# Patient Record
(demographics unavailable — no encounter records)

---

## 2025-03-14 NOTE — ASSESSMENT
[FreeTextEntry1] : 59 YOM with FH of CAD, PAD. JERNIGAN. HLD.  Plan: Low-fat diet discussed. Fasting blood work ordered. Exercise stress ECHO. CT coronary calcium score. F/u after the tests.  Rafa Reyes MD

## 2025-03-14 NOTE — REVIEW OF SYSTEMS
[Dyspnea on exertion] : dyspnea during exertion [Negative] : Neurological [Chest Discomfort] : no chest discomfort [Lower Ext Edema] : no extremity edema [Palpitations] : no palpitations [Syncope] : no syncope [Anxiety] : no anxiety

## 2025-03-14 NOTE — HISTORY OF PRESENT ILLNESS
[FreeTextEntry1] : 59-year-old male with a past medical history of HLD. Retired [employer] 2012, last stress test then.  Patient presents for cardiac evaluation. Gets JERNIGAN walking long distances.   3/2024: LDL 91 Trig 195 GFR 87 A1c 5.2 TS 2.12

## 2025-07-07 NOTE — HISTORY OF PRESENT ILLNESS
[FreeTextEntry1] : 59-year-old male with a past medical history of HLD. Retired [employer] 2012.  Presents for follow up. Denies any exertional chest pain, dyspnea. Has been very sedentary since COVID and plans to resume exercising.  4/2025: CAC Score 8 3/2025: Exercise stress echo negative for ischemia, normal LVSF  4/2025: , TG 74, HDL 39, LDL 82 A1c 5.6 TSH 1.8

## 2025-07-07 NOTE — CARDIOLOGY SUMMARY
[de-identified] : 7/7/25: SR 95bpm, no ST changes. [de-identified] : 3/27/2025 Exercise stress echo: negative for ischemia

## 2025-07-07 NOTE — REVIEW OF SYSTEMS
[Negative] : Neurological [Dyspnea on exertion] : not dyspnea during exertion [Chest Discomfort] : no chest discomfort [Lower Ext Edema] : no extremity edema [Palpitations] : no palpitations [Syncope] : no syncope [Anxiety] : no anxiety

## 2025-07-07 NOTE — ASSESSMENT
[FreeTextEntry1] : 59-yo male HLD, CAC 8. Elevated BP today. Good exercise capacity. No evidence of ischemia.  Plan: Low-salt diet discussed with patient. He will start monitoring his BP at home and keep a log. He will call me and let me know his BP numbers in 2-3 weeks. F/u in 6 months. Will need repeat BW.  Rafa Reyes MD